# Patient Record
Sex: MALE | Race: WHITE | URBAN - METROPOLITAN AREA
[De-identification: names, ages, dates, MRNs, and addresses within clinical notes are randomized per-mention and may not be internally consistent; named-entity substitution may affect disease eponyms.]

---

## 2018-03-01 ENCOUNTER — EMERGENCY (EMERGENCY)
Facility: HOSPITAL | Age: 28
LOS: 0 days | Discharge: HOME | End: 2018-03-01

## 2018-03-01 VITALS
DIASTOLIC BLOOD PRESSURE: 91 MMHG | SYSTOLIC BLOOD PRESSURE: 138 MMHG | TEMPERATURE: 99 F | RESPIRATION RATE: 18 BRPM | OXYGEN SATURATION: 98 % | HEART RATE: 101 BPM

## 2018-03-01 DIAGNOSIS — Z88.8 ALLERGY STATUS TO OTHER DRUGS, MEDICAMENTS AND BIOLOGICAL SUBSTANCES: ICD-10-CM

## 2018-03-01 DIAGNOSIS — R50.9 FEVER, UNSPECIFIED: ICD-10-CM

## 2018-03-01 DIAGNOSIS — B34.9 VIRAL INFECTION, UNSPECIFIED: ICD-10-CM

## 2018-03-01 NOTE — ED ADULT NURSE NOTE - OBJECTIVE STATEMENT
Pt c/o dry cough, chills, fevers, loss of appetite x 1 approximately 1 week. Pt states he has been controlling fevers at home with Motrin. Last dose was today at about 7am. Pt denies n/v, CP, SOB, dizziness. AAO x 4. Denies allergies, med hx.

## 2018-03-01 NOTE — ED PROVIDER NOTE - OBJECTIVE STATEMENT
this is a 28 year old male presenting to the ed for evalution of cough , fever, chills, - lost voice for 4 days- no other ssymptoms - no abdomional pain , no back pain , no n/v

## 2019-04-13 ENCOUNTER — EMERGENCY (EMERGENCY)
Facility: HOSPITAL | Age: 29
LOS: 0 days | Discharge: HOME | End: 2019-04-13
Attending: EMERGENCY MEDICINE | Admitting: EMERGENCY MEDICINE
Payer: COMMERCIAL

## 2019-04-13 VITALS
SYSTOLIC BLOOD PRESSURE: 122 MMHG | HEART RATE: 62 BPM | DIASTOLIC BLOOD PRESSURE: 56 MMHG | TEMPERATURE: 97 F | RESPIRATION RATE: 18 BRPM | OXYGEN SATURATION: 97 %
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
## 2019-04-13 DIAGNOSIS — R51 HEADACHE: ICD-10-CM

## 2019-04-13 DIAGNOSIS — R11.0 NAUSEA: ICD-10-CM

## 2019-04-13 DIAGNOSIS — R07.81 PLEURODYNIA: ICD-10-CM
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
## 2019-04-13 DIAGNOSIS — Z79.899 OTHER LONG TERM (CURRENT) DRUG THERAPY: ICD-10-CM

## 2019-04-13 DIAGNOSIS — Z88.8 ALLERGY STATUS TO OTHER DRUGS, MEDICAMENTS AND BIOLOGICAL SUBSTANCES STATUS: ICD-10-CM

## 2019-04-13 PROCEDURE — 70450 CT HEAD/BRAIN W/O DYE: CPT | Mod: 26

## 2019-04-13 PROCEDURE — 71046 X-RAY EXAM CHEST 2 VIEWS: CPT | Mod: 26

## 2019-04-13 PROCEDURE — 99284 EMERGENCY DEPT VISIT MOD MDM: CPT

## 2019-04-13 RX ORDER — CETIRIZINE HYDROCHLORIDE 10 MG/1
0 TABLET ORAL
Qty: 0 | Refills: 0 | COMMUNITY

## 2019-04-13 RX ORDER — ACETAMINOPHEN 500 MG
975 TABLET ORAL ONCE
Qty: 0 | Refills: 0 | Status: COMPLETED | OUTPATIENT
Start: 2019-04-13 | End: 2019-04-13

## 2019-04-13 RX ORDER — LIDOCAINE 4 G/100G
15 CREAM TOPICAL
Qty: 150 | Refills: 0 | OUTPATIENT
Start: 2019-04-13 | End: 2019-04-15

## 2019-04-13 RX ORDER — METHOCARBAMOL 500 MG/1
2 TABLET, FILM COATED ORAL
Qty: 24 | Refills: 0 | OUTPATIENT
Start: 2019-04-13 | End: 2019-04-15

## 2019-04-13 RX ORDER — METHOCARBAMOL 500 MG/1
1000 TABLET, FILM COATED ORAL ONCE
Qty: 0 | Refills: 0 | Status: COMPLETED | OUTPATIENT
Start: 2019-04-13 | End: 2019-04-13

## 2019-04-13 RX ORDER — IBUPROFEN 200 MG
1 TABLET ORAL
Qty: 12 | Refills: 0 | OUTPATIENT
Start: 2019-04-13 | End: 2019-04-15

## 2019-04-13 RX ADMIN — METHOCARBAMOL 1000 MILLIGRAM(S): 500 TABLET, FILM COATED ORAL at 17:52

## 2019-04-13 RX ADMIN — Medication 975 MILLIGRAM(S): at 17:51

## 2019-04-13 NOTE — ED PROVIDER NOTE - NSFOLLOWUPCLINICS_GEN_ALL_ED_FT
[facility]  Rehabilitation  [address] [place] [address]  Phone: [phone]  Fax:   Follow Up Time:

## 2019-04-13 NOTE — ED PROVIDER NOTE - NS ED ROS FT
Constitutional: No fever, chills, unintended weight loss.  Eyes:  No visual changes, eye pain or discharge.  ENMT:  No hearing changes, pain, no sore throat or runny nose, no difficulty swallowing  Cardiac:  No chest pain, SOB or edema. No chest pain with exertion.  Respiratory:  No cough or respiratory distress. No hemoptysis. No history of asthma or RAD.  GI:  +N, vomiting, diarrhea or abdominal pain.  :  No dysuria, frequency or burning.  MS:  No myalgia, muscle weakness, joint pain or back pain.  Neuro:  +headache, no focal numbness or weakness.  Skin:  No skin rash.   Endocrine: No history of thyroid disease or diabetes.
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
## 2019-04-13 NOTE — ED ADULT NURSE NOTE - NSIMPLEMENTINTERV_GEN_ALL_ED
Implemented All Universal Safety Interventions:  [place] to call system. Call bell, personal items and telephone within reach. Instruct patient to call for assistance. Room bathroom lighting operational. Non-slip footwear when patient is off stretcher. Physically safe environment: no spills, clutter or unnecessary equipment. Stretcher in lowest position, wheels locked, appropriate side rails in place.

## 2019-04-13 NOTE — ED PROVIDER NOTE - CARE PROVIDER_API CALL
Rashida Santoyo (PhD)  [facility]  [address] [place] [address]  Phone: [phone]  Fax: [phone]  Follow Up Time:

## 2019-04-13 NOTE — ED PROVIDER NOTE - PHYSICAL EXAMINATION
VITAL SIGNS: I have reviewed nursing notes and confirm.  CONSTITUTIONAL: Well-developed; well-nourished; in no acute distress.  SKIN: Skin exam is warm and dry, no acute rash.  HEAD: Normocephalic; atraumatic.  EYES: PERRL, EOM intact; conjunctiva and sclera clear.  ENT: No nasal discharge; airway clear.  NECK: Supple; no midline or paraspinal ttp  CHEST: atraumatic, mild ttp over L ant ribs, no ecchymosis or crepitus  CARD: S1, S2 normal; no murmurs, gallops, or rubs. Regular rate and rhythm.  RESP: No wheezes, rales or rhonchi.  ABD: Normal bowel sounds; soft; non-distended; non-tender; no hepatosplenomegaly.  BACK: non-tender, no CVAT  EXT: Normal ROM. No clubbing, cyanosis or edema. DPI.  NEURO: aaox3, cn 2-12 intact bl no nystagmus or facial droop, 5/5 strength x 4 no drift, gross sensation intact, finger-nose nl, gait nl, romberg neg  PSYCH: Cooperative, appropriate.

## 2019-04-13 NOTE — ED ADULT NURSE NOTE - CHPI ED NUR SYMPTOMS NEG
no acting out behaviors/no decreased eating/drinking/no laceration/no bruising/no back pain/no fussiness/no loss of consciousness/no difficulty bearing weight/no neck tenderness/no crying/no headache/no sleeping issues/no disorientation/no dizziness
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
## 2021-06-06 ENCOUNTER — EMERGENCY (EMERGENCY)
Facility: HOSPITAL | Age: 31
LOS: 0 days | Discharge: HOME | End: 2021-06-06
Attending: EMERGENCY MEDICINE | Admitting: EMERGENCY MEDICINE
Payer: MEDICAID

## 2021-06-06 VITALS
RESPIRATION RATE: 18 BRPM | DIASTOLIC BLOOD PRESSURE: 80 MMHG | OXYGEN SATURATION: 99 % | TEMPERATURE: 99 F | HEART RATE: 93 BPM | SYSTOLIC BLOOD PRESSURE: 132 MMHG

## 2021-06-06 VITALS
TEMPERATURE: 100 F | RESPIRATION RATE: 17 BRPM | HEIGHT: 65 IN | HEART RATE: 103 BPM | OXYGEN SATURATION: 98 % | DIASTOLIC BLOOD PRESSURE: 92 MMHG | WEIGHT: 210.98 LBS | SYSTOLIC BLOOD PRESSURE: 131 MMHG

## 2021-06-06 DIAGNOSIS — R07.89 OTHER CHEST PAIN: ICD-10-CM

## 2021-06-06 DIAGNOSIS — R50.9 FEVER, UNSPECIFIED: ICD-10-CM

## 2021-06-06 DIAGNOSIS — R06.00 DYSPNEA, UNSPECIFIED: ICD-10-CM

## 2021-06-06 DIAGNOSIS — R06.02 SHORTNESS OF BREATH: ICD-10-CM

## 2021-06-06 DIAGNOSIS — R50.83 POSTVACCINATION FEVER: ICD-10-CM

## 2021-06-06 DIAGNOSIS — Z88.8 ALLERGY STATUS TO OTHER DRUGS, MEDICAMENTS AND BIOLOGICAL SUBSTANCES STATUS: ICD-10-CM

## 2021-06-06 LAB
ALBUMIN SERPL ELPH-MCNC: 5.1 G/DL — SIGNIFICANT CHANGE UP (ref 3.5–5.2)
ALP SERPL-CCNC: 54 U/L — SIGNIFICANT CHANGE UP (ref 30–115)
ALT FLD-CCNC: 28 U/L — SIGNIFICANT CHANGE UP (ref 0–41)
ANION GAP SERPL CALC-SCNC: 11 MMOL/L — SIGNIFICANT CHANGE UP (ref 7–14)
AST SERPL-CCNC: 20 U/L — SIGNIFICANT CHANGE UP (ref 0–41)
BASOPHILS # BLD AUTO: 0.03 K/UL — SIGNIFICANT CHANGE UP (ref 0–0.2)
BASOPHILS NFR BLD AUTO: 0.5 % — SIGNIFICANT CHANGE UP (ref 0–1)
BILIRUB SERPL-MCNC: 0.3 MG/DL — SIGNIFICANT CHANGE UP (ref 0.2–1.2)
BUN SERPL-MCNC: 15 MG/DL — SIGNIFICANT CHANGE UP (ref 10–20)
CALCIUM SERPL-MCNC: 10.2 MG/DL — HIGH (ref 8.5–10.1)
CHLORIDE SERPL-SCNC: 101 MMOL/L — SIGNIFICANT CHANGE UP (ref 98–110)
CO2 SERPL-SCNC: 24 MMOL/L — SIGNIFICANT CHANGE UP (ref 17–32)
CREAT SERPL-MCNC: 1 MG/DL — SIGNIFICANT CHANGE UP (ref 0.7–1.5)
EOSINOPHIL # BLD AUTO: 0.04 K/UL — SIGNIFICANT CHANGE UP (ref 0–0.7)
EOSINOPHIL NFR BLD AUTO: 0.7 % — SIGNIFICANT CHANGE UP (ref 0–8)
GLUCOSE SERPL-MCNC: 97 MG/DL — SIGNIFICANT CHANGE UP (ref 70–99)
HCT VFR BLD CALC: 46.4 % — SIGNIFICANT CHANGE UP (ref 42–52)
HGB BLD-MCNC: 15.5 G/DL — SIGNIFICANT CHANGE UP (ref 14–18)
IMM GRANULOCYTES NFR BLD AUTO: 0.5 % — HIGH (ref 0.1–0.3)
LYMPHOCYTES # BLD AUTO: 1.06 K/UL — LOW (ref 1.2–3.4)
LYMPHOCYTES # BLD AUTO: 17.8 % — LOW (ref 20.5–51.1)
MCHC RBC-ENTMCNC: 28.7 PG — SIGNIFICANT CHANGE UP (ref 27–31)
MCHC RBC-ENTMCNC: 33.4 G/DL — SIGNIFICANT CHANGE UP (ref 32–37)
MCV RBC AUTO: 85.9 FL — SIGNIFICANT CHANGE UP (ref 80–94)
MONOCYTES # BLD AUTO: 0.77 K/UL — HIGH (ref 0.1–0.6)
MONOCYTES NFR BLD AUTO: 12.9 % — HIGH (ref 1.7–9.3)
NEUTROPHILS # BLD AUTO: 4.03 K/UL — SIGNIFICANT CHANGE UP (ref 1.4–6.5)
NEUTROPHILS NFR BLD AUTO: 67.6 % — SIGNIFICANT CHANGE UP (ref 42.2–75.2)
NRBC # BLD: 0 /100 WBCS — SIGNIFICANT CHANGE UP (ref 0–0)
PLATELET # BLD AUTO: 234 K/UL — SIGNIFICANT CHANGE UP (ref 130–400)
POTASSIUM SERPL-MCNC: 4.3 MMOL/L — SIGNIFICANT CHANGE UP (ref 3.5–5)
POTASSIUM SERPL-SCNC: 4.3 MMOL/L — SIGNIFICANT CHANGE UP (ref 3.5–5)
PROT SERPL-MCNC: 7.8 G/DL — SIGNIFICANT CHANGE UP (ref 6–8)
RBC # BLD: 5.4 M/UL — SIGNIFICANT CHANGE UP (ref 4.7–6.1)
RBC # FLD: 12.9 % — SIGNIFICANT CHANGE UP (ref 11.5–14.5)
SODIUM SERPL-SCNC: 136 MMOL/L — SIGNIFICANT CHANGE UP (ref 135–146)
TROPONIN T SERPL-MCNC: <0.01 NG/ML — SIGNIFICANT CHANGE UP
WBC # BLD: 5.96 K/UL — SIGNIFICANT CHANGE UP (ref 4.8–10.8)
WBC # FLD AUTO: 5.96 K/UL — SIGNIFICANT CHANGE UP (ref 4.8–10.8)

## 2021-06-06 PROCEDURE — 71045 X-RAY EXAM CHEST 1 VIEW: CPT | Mod: 26

## 2021-06-06 PROCEDURE — 99285 EMERGENCY DEPT VISIT HI MDM: CPT

## 2021-06-06 PROCEDURE — 93010 ELECTROCARDIOGRAM REPORT: CPT

## 2021-06-06 RX ORDER — ACETAMINOPHEN 500 MG
650 TABLET ORAL ONCE
Refills: 0 | Status: COMPLETED | OUTPATIENT
Start: 2021-06-06 | End: 2021-06-06

## 2021-06-06 RX ORDER — SODIUM CHLORIDE 9 MG/ML
1000 INJECTION, SOLUTION INTRAVENOUS ONCE
Refills: 0 | Status: COMPLETED | OUTPATIENT
Start: 2021-06-06 | End: 2021-06-06

## 2021-06-06 RX ADMIN — Medication 650 MILLIGRAM(S): at 11:46

## 2021-06-06 RX ADMIN — SODIUM CHLORIDE 1000 MILLILITER(S): 9 INJECTION, SOLUTION INTRAVENOUS at 11:46

## 2021-06-06 NOTE — ED PROVIDER NOTE - NS ED ROS FT
Eyes:  No visual changes, eye pain or discharge.  ENMT:  No hearing changes, pain, no sore throat or runny nose, no difficulty swallowing  Cardiac:  + chest pain + SOB  Respiratory:  No cough or respiratory distress. No hemoptysis. No history of asthma or RAD.  GI:  No nausea, vomiting, diarrhea or abdominal pain.  :  No dysuria, frequency or burning.  MS:  No myalgia, muscle weakness, joint pain or back pain.  Neuro:  No headache or weakness.  No LOC.  Skin:  No skin rash.   Endocrine: No history of thyroid disease or diabetes.

## 2021-06-06 NOTE — ED PROVIDER NOTE - ATTENDING CONTRIBUTION TO CARE
31 year old male, no pmhx presenting s/p COVID vaccine yesterday with chest pain and dyspnea since this AM. Pain is described as sharp, non-radiating, non-pleuritic, no palliative or provocative factors, mild severity, associated with dyspnea and fever, tmax 100F. Patient has been able to ambulate despite symptoms. Denies cough, N/V/D, blood in stool or any other complaints.    Vital Signs: I have reviewed the initial vital signs.  Constitutional: NAD, well-nourished, appears stated age, no acute distress.  HEENT: Airway patent, moist MM, no erythema/swelling/deformity of oral structures. EOMI, PERRLA.  CV: regular rate, regular rhythm, well-perfused extremities, 2+ b/l DP and radial pulses equal.  Lungs: BCTA, no increased WOB.  ABD: NTND, no guarding or rebound, no pulsatile mass, no hernias.   MSK: Neck supple, nontender, nl ROM, no stepoff. Chest nontender. Back nontender in TLS spine or to b/l bony structures or flanks. Ext nontender, nl rom, no deformity.   INTEG: Skin warm, dry, no rash.  NEURO: A&Ox3, normal strength, nl sensation throughout, normal speech.   PSYCH: Calm, cooperative, normal affect and interaction.    Very well appearing, ambulatory in ED without difficulty or desaturation. Will obtain EKG, CXR, labs, re-eval.

## 2021-06-06 NOTE — ED PROVIDER NOTE - CLINICAL SUMMARY MEDICAL DECISION MAKING FREE TEXT BOX
Patient presented with chest pain since yesterday s/p COVID vaccine. Otherwise afebrile, Hd stable, well appearing. EKG obtained and non-ischemic without evidence of STEMI or pericarditis. Obtained labs which were grossly unremarkable including no significant leukocytosis, anemia, signs of dehydration/ANG, transaminitis or significant electrolyte abnormalities. Trop negative. Chest xray negative for pneumothorax, pneumonia, widened mediastinum, evidence of rib fractures, enlarged cardiac silhouette or any other emergent pathologies. Patient remained without recurrence of chest pain or abnormalities during monitoring in ED. Ambulatory without difficulty, tolerates PO. HEART Score 0, PERC 0/no PE risk factors, no concern clinically for dissection. Given the above, will discharge home with outpatient follow up. Patient agreeable with plan. Agrees to return to ED for any new or worsening symptoms.

## 2021-06-06 NOTE — ED PROVIDER NOTE - PHYSICAL EXAMINATION
CONSTITUTIONAL: Well-developed; well-nourished; in no acute distress.   SKIN: warm, dry  HEAD: Normocephalic; atraumatic.  EYES: PERRL, EOMI, no conjunctival erythema  ENT: No nasal discharge; airway clear.  NECK: Supple; non tender.  CARD: S1, S2 normal; no murmurs, gallops, or rubs. tachy.   RESP: No wheezes, rales or rhonchi.  ABD: soft ntnd  EXT: Normal ROM.  No clubbing, cyanosis or edema.   LYMPH: No acute cervical adenopathy.  NEURO: Alert, oriented, grossly unremarkable  PSYCH: Cooperative, appropriate.

## 2021-06-06 NOTE — ED PROVIDER NOTE - PROGRESS NOTE DETAILS
Duy: Pt reassed. Endorses feeling much better. CP has resolved. No more dizziness. Pt. revitaled. Afebrile 98.9, nontachycardic 89, sating at 99% on room air.

## 2021-06-06 NOTE — ED PROVIDER NOTE - OBJECTIVE STATEMENT
31 y male, no pmh, pw cp. Endorses getting his second dose of covid vaccine yesterday. Then last night, pt started having chest pain, stabbing sensation, lower left chest, 6/10, on and off, no all/agg factors, associated with 3-4 episodes of sob that resolved and low grade fever around 100. Rt. now pt does not have the pain and is only tired from not sleeping. Denies cough n/v/d, abd pain, dysuria, syncopal episode

## 2021-06-06 NOTE — ED PROVIDER NOTE - PATIENT PORTAL LINK FT
You can access the FollowMyHealth Patient Portal offered by [facility] by registering at the following website: [email or web address]. By joining [facility]’s FollowMyHealth portal, you will also be able to view your health information using other applications (apps) compatible with our system.

## 2021-10-08 ENCOUNTER — OUTPATIENT (OUTPATIENT)
Dept: OUTPATIENT SERVICES | Facility: HOSPITAL | Age: 31
LOS: 1 days | Discharge: HOME | End: 2021-10-08
Payer: COMMERCIAL

## 2021-10-08 DIAGNOSIS — R22.2 LOCALIZED SWELLING, MASS AND LUMP, TRUNK: ICD-10-CM

## 2021-10-08 PROCEDURE — 71120 X-RAY EXAM BREASTBONE 2/>VWS: CPT | Mod: 26

## 2021-10-08 PROCEDURE — 76604 US EXAM CHEST: CPT | Mod: 26

## 2021-11-16 ENCOUNTER — OUTPATIENT (OUTPATIENT)
Dept: OUTPATIENT SERVICES | Facility: HOSPITAL | Age: 31
LOS: 1 days | Discharge: HOME | End: 2021-11-16
Payer: COMMERCIAL

## 2021-11-16 DIAGNOSIS — R22.2 LOCALIZED SWELLING, MASS AND LUMP, TRUNK: ICD-10-CM

## 2021-11-16 PROBLEM — Z00.00 ENCOUNTER FOR PREVENTIVE HEALTH EXAMINATION: Status: ACTIVE | Noted: 2021-11-16

## 2021-11-16 PROCEDURE — 71552 MRI CHEST W/O & W/DYE: CPT | Mod: 26

## 2023-05-11 ENCOUNTER — EMERGENCY (EMERGENCY)
Facility: HOSPITAL | Age: 33
LOS: 0 days | Discharge: HOME | End: 2023-05-12
Attending: EMERGENCY MEDICINE
Payer: COMMERCIAL

## 2023-05-11 VITALS
TEMPERATURE: 99 F | SYSTOLIC BLOOD PRESSURE: 151 MMHG | OXYGEN SATURATION: 97 % | HEART RATE: 111 BPM | DIASTOLIC BLOOD PRESSURE: 101 MMHG | WEIGHT: 164.91 LBS | RESPIRATION RATE: 19 BRPM

## 2023-05-11 DIAGNOSIS — R09.81 NASAL CONGESTION: ICD-10-CM

## 2023-05-11 DIAGNOSIS — Z88.8 ALLERGY STATUS TO OTHER DRUGS, MEDICAMENTS AND BIOLOGICAL SUBSTANCES: ICD-10-CM

## 2023-05-11 DIAGNOSIS — R10.11 RIGHT UPPER QUADRANT PAIN: ICD-10-CM

## 2023-05-11 DIAGNOSIS — R05.1 ACUTE COUGH: ICD-10-CM

## 2023-05-11 DIAGNOSIS — K52.9 NONINFECTIVE GASTROENTERITIS AND COLITIS, UNSPECIFIED: ICD-10-CM

## 2023-05-11 DIAGNOSIS — R10.31 RIGHT LOWER QUADRANT PAIN: ICD-10-CM

## 2023-05-11 DIAGNOSIS — K76.0 FATTY (CHANGE OF) LIVER, NOT ELSEWHERE CLASSIFIED: ICD-10-CM

## 2023-05-11 PROCEDURE — 99285 EMERGENCY DEPT VISIT HI MDM: CPT

## 2023-05-11 PROCEDURE — 85025 COMPLETE CBC W/AUTO DIFF WBC: CPT

## 2023-05-11 PROCEDURE — 99284 EMERGENCY DEPT VISIT MOD MDM: CPT | Mod: 25

## 2023-05-11 PROCEDURE — 80053 COMPREHEN METABOLIC PANEL: CPT

## 2023-05-11 PROCEDURE — 36415 COLL VENOUS BLD VENIPUNCTURE: CPT

## 2023-05-11 PROCEDURE — 74177 CT ABD & PELVIS W/CONTRAST: CPT | Mod: MA

## 2023-05-11 PROCEDURE — 83690 ASSAY OF LIPASE: CPT

## 2023-05-11 PROCEDURE — 96374 THER/PROPH/DIAG INJ IV PUSH: CPT

## 2023-05-11 PROCEDURE — 83605 ASSAY OF LACTIC ACID: CPT

## 2023-05-11 PROCEDURE — 81001 URINALYSIS AUTO W/SCOPE: CPT

## 2023-05-11 PROCEDURE — 87086 URINE CULTURE/COLONY COUNT: CPT

## 2023-05-11 PROCEDURE — 71046 X-RAY EXAM CHEST 2 VIEWS: CPT

## 2023-05-11 PROCEDURE — 96375 TX/PRO/DX INJ NEW DRUG ADDON: CPT

## 2023-05-11 NOTE — ED ADULT TRIAGE NOTE - CHIEF COMPLAINT QUOTE
Pt came c/o RLQ pain x 2 days, cough and congestion x 1 week, pt stated he was exposed to mold for unknown amount of time.

## 2023-05-12 VITALS
OXYGEN SATURATION: 100 % | HEART RATE: 77 BPM | DIASTOLIC BLOOD PRESSURE: 88 MMHG | TEMPERATURE: 99 F | RESPIRATION RATE: 18 BRPM | SYSTOLIC BLOOD PRESSURE: 134 MMHG

## 2023-05-12 LAB
ALBUMIN SERPL ELPH-MCNC: 4.1 G/DL — SIGNIFICANT CHANGE UP (ref 3.5–5.2)
ALP SERPL-CCNC: 51 U/L — SIGNIFICANT CHANGE UP (ref 30–115)
ALT FLD-CCNC: 24 U/L — SIGNIFICANT CHANGE UP (ref 0–41)
ANION GAP SERPL CALC-SCNC: 16 MMOL/L — HIGH (ref 7–14)
APPEARANCE UR: CLEAR — SIGNIFICANT CHANGE UP
AST SERPL-CCNC: 29 U/L — SIGNIFICANT CHANGE UP (ref 0–41)
BACTERIA # UR AUTO: NEGATIVE — SIGNIFICANT CHANGE UP
BASOPHILS # BLD AUTO: 0.06 K/UL — SIGNIFICANT CHANGE UP (ref 0–0.2)
BASOPHILS NFR BLD AUTO: 0.8 % — SIGNIFICANT CHANGE UP (ref 0–1)
BILIRUB SERPL-MCNC: 0.3 MG/DL — SIGNIFICANT CHANGE UP (ref 0.2–1.2)
BILIRUB UR-MCNC: NEGATIVE — SIGNIFICANT CHANGE UP
BUN SERPL-MCNC: 13 MG/DL — SIGNIFICANT CHANGE UP (ref 10–20)
CALCIUM SERPL-MCNC: 8.7 MG/DL — SIGNIFICANT CHANGE UP (ref 8.4–10.5)
CHLORIDE SERPL-SCNC: 100 MMOL/L — SIGNIFICANT CHANGE UP (ref 98–110)
CO2 SERPL-SCNC: 23 MMOL/L — SIGNIFICANT CHANGE UP (ref 17–32)
COLOR SPEC: YELLOW — SIGNIFICANT CHANGE UP
CREAT SERPL-MCNC: 0.7 MG/DL — SIGNIFICANT CHANGE UP (ref 0.7–1.5)
DIFF PNL FLD: NEGATIVE — SIGNIFICANT CHANGE UP
EGFR: 123 ML/MIN/1.73M2 — SIGNIFICANT CHANGE UP
EOSINOPHIL # BLD AUTO: 0.28 K/UL — SIGNIFICANT CHANGE UP (ref 0–0.7)
EOSINOPHIL NFR BLD AUTO: 3.6 % — SIGNIFICANT CHANGE UP (ref 0–8)
EPI CELLS # UR: 1 /HPF — SIGNIFICANT CHANGE UP (ref 0–5)
GLUCOSE SERPL-MCNC: 122 MG/DL — HIGH (ref 70–99)
GLUCOSE UR QL: NEGATIVE — SIGNIFICANT CHANGE UP
HCT VFR BLD CALC: 40.9 % — LOW (ref 42–52)
HGB BLD-MCNC: 14 G/DL — SIGNIFICANT CHANGE UP (ref 14–18)
HYALINE CASTS # UR AUTO: 2 /LPF — SIGNIFICANT CHANGE UP (ref 0–7)
IMM GRANULOCYTES NFR BLD AUTO: 0.4 % — HIGH (ref 0.1–0.3)
KETONES UR-MCNC: ABNORMAL
LACTATE SERPL-SCNC: 2 MMOL/L — SIGNIFICANT CHANGE UP (ref 0.7–2)
LEUKOCYTE ESTERASE UR-ACNC: NEGATIVE — SIGNIFICANT CHANGE UP
LIDOCAIN IGE QN: 20 U/L — SIGNIFICANT CHANGE UP (ref 7–60)
LYMPHOCYTES # BLD AUTO: 1.25 K/UL — SIGNIFICANT CHANGE UP (ref 1.2–3.4)
LYMPHOCYTES # BLD AUTO: 15.9 % — LOW (ref 20.5–51.1)
MCHC RBC-ENTMCNC: 29.6 PG — SIGNIFICANT CHANGE UP (ref 27–31)
MCHC RBC-ENTMCNC: 34.2 G/DL — SIGNIFICANT CHANGE UP (ref 32–37)
MCV RBC AUTO: 86.5 FL — SIGNIFICANT CHANGE UP (ref 80–94)
MONOCYTES # BLD AUTO: 0.83 K/UL — HIGH (ref 0.1–0.6)
MONOCYTES NFR BLD AUTO: 10.6 % — HIGH (ref 1.7–9.3)
NEUTROPHILS # BLD AUTO: 5.41 K/UL — SIGNIFICANT CHANGE UP (ref 1.4–6.5)
NEUTROPHILS NFR BLD AUTO: 68.7 % — SIGNIFICANT CHANGE UP (ref 42.2–75.2)
NITRITE UR-MCNC: NEGATIVE — SIGNIFICANT CHANGE UP
NRBC # BLD: 0 /100 WBCS — SIGNIFICANT CHANGE UP (ref 0–0)
PH UR: 6 — SIGNIFICANT CHANGE UP (ref 5–8)
PLATELET # BLD AUTO: 333 K/UL — SIGNIFICANT CHANGE UP (ref 130–400)
PMV BLD: 9.6 FL — SIGNIFICANT CHANGE UP (ref 7.4–10.4)
POTASSIUM SERPL-MCNC: 3.9 MMOL/L — SIGNIFICANT CHANGE UP (ref 3.5–5)
POTASSIUM SERPL-SCNC: 3.9 MMOL/L — SIGNIFICANT CHANGE UP (ref 3.5–5)
PROT SERPL-MCNC: 6.5 G/DL — SIGNIFICANT CHANGE UP (ref 6–8)
PROT UR-MCNC: ABNORMAL
RBC # BLD: 4.73 M/UL — SIGNIFICANT CHANGE UP (ref 4.7–6.1)
RBC # FLD: 12.4 % — SIGNIFICANT CHANGE UP (ref 11.5–14.5)
RBC CASTS # UR COMP ASSIST: 1 /HPF — SIGNIFICANT CHANGE UP (ref 0–4)
SODIUM SERPL-SCNC: 139 MMOL/L — SIGNIFICANT CHANGE UP (ref 135–146)
SP GR SPEC: >1.05 (ref 1.01–1.03)
UROBILINOGEN FLD QL: SIGNIFICANT CHANGE UP
WBC # BLD: 7.86 K/UL — SIGNIFICANT CHANGE UP (ref 4.8–10.8)
WBC # FLD AUTO: 7.86 K/UL — SIGNIFICANT CHANGE UP (ref 4.8–10.8)
WBC UR QL: 1 /HPF — SIGNIFICANT CHANGE UP (ref 0–5)

## 2023-05-12 PROCEDURE — 74177 CT ABD & PELVIS W/CONTRAST: CPT | Mod: 26,MA

## 2023-05-12 PROCEDURE — 71046 X-RAY EXAM CHEST 2 VIEWS: CPT | Mod: 26

## 2023-05-12 RX ORDER — SACCHAROMYCES BOULARDII 250 MG
2 POWDER IN PACKET (EA) ORAL
Qty: 28 | Refills: 0
Start: 2023-05-12 | End: 2023-05-18

## 2023-05-12 RX ORDER — SODIUM CHLORIDE 9 MG/ML
1000 INJECTION, SOLUTION INTRAVENOUS ONCE
Refills: 0 | Status: COMPLETED | OUTPATIENT
Start: 2023-05-12 | End: 2023-05-12

## 2023-05-12 RX ORDER — CIPROFLOXACIN LACTATE 400MG/40ML
1 VIAL (ML) INTRAVENOUS
Qty: 14 | Refills: 0
Start: 2023-05-12 | End: 2023-05-18

## 2023-05-12 RX ORDER — SODIUM CHLORIDE 9 MG/ML
1000 INJECTION INTRAMUSCULAR; INTRAVENOUS; SUBCUTANEOUS ONCE
Refills: 0 | Status: COMPLETED | OUTPATIENT
Start: 2023-05-12 | End: 2023-05-12

## 2023-05-12 RX ORDER — CEFTRIAXONE 500 MG/1
1000 INJECTION, POWDER, FOR SOLUTION INTRAMUSCULAR; INTRAVENOUS ONCE
Refills: 0 | Status: COMPLETED | OUTPATIENT
Start: 2023-05-12 | End: 2023-05-12

## 2023-05-12 RX ORDER — MORPHINE SULFATE 50 MG/1
4 CAPSULE, EXTENDED RELEASE ORAL ONCE
Refills: 0 | Status: DISCONTINUED | OUTPATIENT
Start: 2023-05-12 | End: 2023-05-12

## 2023-05-12 RX ORDER — METRONIDAZOLE 500 MG
500 TABLET ORAL ONCE
Refills: 0 | Status: COMPLETED | OUTPATIENT
Start: 2023-05-12 | End: 2023-05-12

## 2023-05-12 RX ORDER — METRONIDAZOLE 500 MG
1 TABLET ORAL
Qty: 21 | Refills: 0
Start: 2023-05-12 | End: 2023-05-18

## 2023-05-12 RX ADMIN — CEFTRIAXONE 100 MILLIGRAM(S): 500 INJECTION, POWDER, FOR SOLUTION INTRAMUSCULAR; INTRAVENOUS at 08:09

## 2023-05-12 RX ADMIN — SODIUM CHLORIDE 1000 MILLILITER(S): 9 INJECTION, SOLUTION INTRAVENOUS at 00:00

## 2023-05-12 RX ADMIN — SODIUM CHLORIDE 1000 MILLILITER(S): 9 INJECTION INTRAMUSCULAR; INTRAVENOUS; SUBCUTANEOUS at 05:00

## 2023-05-12 RX ADMIN — Medication 100 MILLIGRAM(S): at 08:09

## 2023-05-12 NOTE — ED PROVIDER NOTE - ATTENDING CONTRIBUTION TO CARE
I personally evaluated the patient. I reviewed the Resident’s or Physician Assistant’s note (as assigned above), and agree with the findings and plan except as documented in my note.  33-year-old male, otherwise healthy, presents with complaints of right flank pain for the past several hours.  Denies associated nausea or vomiting.  Denies fever, no diarrhea, no urinary symptoms.  Patient also complains of 1 week of cough. VSS, non toxic appearing, NAD, Head NCAT, MMM, neck supple, normal ROM, normal s1s2, lungs ctab, abd s/nondistended, tenderness to palpation of the right lower quadrant, no guarding or rebound, extremities wnl, AAO x 3, GCS 15, neuro grossly normal. No acute skin lesions. Plan is labs, imaging, meds and reassess.

## 2023-05-12 NOTE — ED PROVIDER NOTE - OBJECTIVE STATEMENT
33 year old male with no pmh presenting to the ED for R flank/R sided abdominal pain. States that it started this evening while at rest, pain has been constant. Also with 2 weeks of cough- thinks he may have had exposure to mold. Associated w/ congestion but states that the cough has been improving. No fevers/chills or n/v.

## 2023-05-12 NOTE — ED PROVIDER NOTE - CLINICAL SUMMARY MEDICAL DECISION MAKING FREE TEXT BOX
ccruz - pt signed out to me by Dr Elliott, pt found to have colitis, dc home w po abx, f/u pmd/gi 1-2 weeks, strict return precautions provided. pt also informed of fatty liver.

## 2023-05-12 NOTE — ED ADULT NURSE NOTE - NSFALLUNIVINTERV_ED_ALL_ED
Bed/Stretcher in lowest position, wheels locked, appropriate side rails in place/Call bell, personal items and telephone in reach/Instruct patient to call for assistance before getting out of bed/chair/stretcher/Non-slip footwear applied when patient is off stretcher/[place] to call system/Physically safe environment - no spills, clutter or unnecessary equipment/Purposeful proactive rounding/Room/bathroom lighting operational, light cord in reach

## 2023-05-12 NOTE — ED PROVIDER NOTE - PHYSICAL EXAMINATION
CONSTITUTIONAL: Well-developed; well-nourished; in no acute distress.   SKIN: warm, dry; no rashes.  HEAD: Normocephalic; atraumatic.  EYES: PERRLA, EOMI, no conjunctival erythema.  ENT: No nasal discharge; airway clear.  NECK: Supple; non tender.  CARD: S1, S2 normal; no murmurs, gallops, or rubs. Regular rate and rhythm.   RESP: No wheezes, rales or rhonchi. lungs ctab.  ABD: soft, nd, (+) R flank/RLQ abdominal ttp.  EXT: Normal ROM.  No clubbing, cyanosis or edema.  NEURO: Alert, oriented, grossly unremarkable. No FND.  PSYCH: Cooperative, appropriate.

## 2023-05-12 NOTE — ED ADULT NURSE REASSESSMENT NOTE - NS ED NURSE REASSESS COMMENT FT1
Pt assessed, A&Ox4, VSS. Pt denies pain/discomfort at this time, awaiting urine sample. IV intact. No acute distress noted. Safety and comfort measures maintained. Will continue to monitor

## 2023-05-12 NOTE — ED PROVIDER NOTE - PROGRESS NOTE DETAILS
Resident AO: Patient received as signout from Dr. Mcfarland, pending reassessment, results, and disposition.  CT with wall thickening of transverse colon versus underdistention, possibly representative of colitis; fatty liver noted.  On exam, patient comfortable without any acute distress.  Abdomen soft and nontender.  Pending urinalysis.  Patient taken to the restroom to urinate, however states he does not have to at this time.  Currently receiving second IV liter bolus.  Overnight team ordered ceftriaxone and Flagyl for colitis and UTI coverage, however low suspicion for UTI. Resident AO: Results reviewed and discussed with patient. All questions answered, patient expressed understanding, and agreed with outpatient follow up. Return precautions given.

## 2023-05-12 NOTE — ED ADULT NURSE NOTE - NS ED NOTE ABUSE SUSPICION NEGLECT YN
Prescription approved per [facility] Refill Protocol.  Beatriz Raza, BSN, RN, PHN  [facility] RN for [place]  June 2, 2022    
No

## 2023-05-12 NOTE — ED PROVIDER NOTE - NSFOLLOWUPINSTRUCTIONS_ED_ALL_ED_FT
You were evaluated in the Emergency Department today, and your visit did not reveal anything immediately life-threatening.    However, it is important that you follow-up with your PRIMARY CARE PHYSICIAN within 3-5 days.  If you do not have a primary care physician, please call your health insurance to select one.  If you do not have health insurance, please schedule an appointment with the [facility]: [phone], located at [address] [place] [address].    Additionally, it is important that you follow-up with GASTROENTEROLOGY. Our Emergency Department Referral Coordinators will be reaching out to you in the next 24-48 hours from 9:00am to 5:00pm with a follow up appointment(s). Please expect a phone call from the hospital in that time frame. If you do not receive a call or if you have any questions or concerns, you can reach them at [phone].    ---------------------------------------------------------------------------------------------------    For pain, you may take the following over-the-counter medication(s):  - Acetaminophen (Tylenol, Midol) 650-1000 mg up to every 4-6 hours, as needed (max 4000mg/24hrs), AND/OR  - Ibuprofen (Motrin, Advil) 400 mg up to every 6-8 hours, as needed (max 3200mg/24hrs)    Antibiotics were sent to your pharmacy.    ---------------------------------------------------------------------------------------------------    Colitis    Colitis is a condition in which the colon is inflamed. It can cause diarrhea, blood in the stool, and abdominal pain. Colitis can last a short time (be acute), or it may last a long time (become chronic).    What are the causes?  This condition may be caused by:  Infections from viruses or bacteria.  A reaction to medicine.  Certain autoimmune diseases, such as Crohn's disease or ulcerative colitis.  Radiation treatment.  Decreased blood flow to the bowel (ischemia).    What are the signs or symptoms?  Symptoms of this condition include:  Diarrhea, blood in the stool, or black, tarry stool.  Pain in the joints or abdominal pain.  Fever or fatigue.  Vomiting.  Weight loss.  Bloating.  Having fewer bowel movements than usual.  A strong and sudden urge to have a bowel movement.  Feeling like the bowel is not empty after a bowel movement.    How is this treated?  Treatment for this condition depends on the cause. This condition may be treated with:  Steps to rest the bowel, such as not eating or drinking for a period of time.  Fluids that are given through an IV.  Medicine for pain and diarrhea.  Antibiotic medicines.  Cortisone medicines.  Surgery.    Follow these instructions at home:  Eating and drinking  Follow instructions from your health care provider about eating or drinking restrictions.  Drink enough fluid to keep your urine pale yellow.  Work with a dietitian to determine whether certain foods cause your condition to flare up.  Avoid foods or drinks that cause flare-ups.  Eat a well-balanced diet.    General instructions  If you were prescribed an antibiotic medicine, take it as told by your health care provider. Do not stop taking the antibiotic even if you start to feel better.  Take over-the-counter and prescription medicines only as told by your health care provider.  Keep all follow-up visits. This is important.    Contact a health care provider if:  Your symptoms do not go away.  You develop new symptoms.    Get help right away if:  You have a fever that does not go away with treatment.  You develop chills.  You have extreme weakness, fainting, or dehydration.  You vomit repeatedly.  You develop severe pain in your abdomen.  You pass bloody or tarry stool.    Summary  Colitis is a condition in which the colon is inflamed. Colitis can last a short time (be acute), or it may last a long time (become chronic).  Treatment for this condition depends on the cause and may include resting the bowel, taking medicines, or having surgery.  If you were prescribed an antibiotic medicine, take it as told by your health care provider. Do not stop taking the antibiotic even if you start to feel better.  Get help right away if you develop severe pain in your abdomen.  Keep all follow-up visits. This is important.    ---------------------------------------------------------------------------------------------------    Cough    Coughing is a reflex that clears your throat and your airways. Coughing helps to heal and protect your lungs. It is normal to cough occasionally, but a cough that happens with other symptoms or lasts a long time may be a sign of a condition that needs treatment. Coughing may be caused by infections, asthma or COPD, smoking, postnasal drip, gastroesophageal reflux, as well as other medical conditions. Take medicines only as instructed by your health care provider. Avoid environments or triggers that causes you to cough at work or at home.    SEEK IMMEDIATE MEDICAL CARE IF YOU HAVE ANY OF THE FOLLOWING SYMPTOMS: coughing up blood, shortness of breath, rapid heart rate, chest pain, unexplained weight loss or night sweats. You were evaluated in the Emergency Department today, and your visit did not reveal anything immediately life-threatening.    However, it is important that you follow-up with your PRIMARY CARE PHYSICIAN within 3-5 days.  If you do not have a primary care physician, please call your health insurance to select one.  If you do not have health insurance, please schedule an appointment with the [facility]: [phone], located at [address] [place] [address]. Let your primary care doctor know about the following findings:  	- Apparent wall thickening of the transverse colon versus   	underdistention may represent colitis in the appropriate clinical setting   	of infectious or inflammatory etiology.  	- Fatty infiltration of the liver (hepatic steatosis).    Additionally, it is important that you follow-up with GASTROENTEROLOGY. Our Emergency Department Referral Coordinators will be reaching out to you in the next 24-48 hours from 9:00am to 5:00pm with a follow up appointment(s). Please expect a phone call from the hospital in that time frame. If you do not receive a call or if you have any questions or concerns, you can reach them at [phone].    ---------------------------------------------------------------------------------------------------    For pain, you may take the following over-the-counter medication(s):  - Acetaminophen (Tylenol, Midol) 650-1000 mg up to every 4-6 hours, as needed (max 4000mg/24hrs), AND/OR  - Ibuprofen (Motrin, Advil) 400 mg up to every 6-8 hours, as needed (max 3200mg/24hrs)    Antibiotics were sent to your pharmacy.    ---------------------------------------------------------------------------------------------------    Colitis    Colitis is a condition in which the colon is inflamed. It can cause diarrhea, blood in the stool, and abdominal pain. Colitis can last a short time (be acute), or it may last a long time (become chronic).    What are the causes?  This condition may be caused by:  Infections from viruses or bacteria.  A reaction to medicine.  Certain autoimmune diseases, such as Crohn's disease or ulcerative colitis.  Radiation treatment.  Decreased blood flow to the bowel (ischemia).    What are the signs or symptoms?  Symptoms of this condition include:  Diarrhea, blood in the stool, or black, tarry stool.  Pain in the joints or abdominal pain.  Fever or fatigue.  Vomiting.  Weight loss.  Bloating.  Having fewer bowel movements than usual.  A strong and sudden urge to have a bowel movement.  Feeling like the bowel is not empty after a bowel movement.    How is this treated?  Treatment for this condition depends on the cause. This condition may be treated with:  Steps to rest the bowel, such as not eating or drinking for a period of time.  Fluids that are given through an IV.  Medicine for pain and diarrhea.  Antibiotic medicines.  Cortisone medicines.  Surgery.    Follow these instructions at home:  Eating and drinking  Follow instructions from your health care provider about eating or drinking restrictions.  Drink enough fluid to keep your urine pale yellow.  Work with a dietitian to determine whether certain foods cause your condition to flare up.  Avoid foods or drinks that cause flare-ups.  Eat a well-balanced diet.    General instructions  If you were prescribed an antibiotic medicine, take it as told by your health care provider. Do not stop taking the antibiotic even if you start to feel better.  Take over-the-counter and prescription medicines only as told by your health care provider.  Keep all follow-up visits. This is important.    Contact a health care provider if:  Your symptoms do not go away.  You develop new symptoms.    Get help right away if:  You have a fever that does not go away with treatment.  You develop chills.  You have extreme weakness, fainting, or dehydration.  You vomit repeatedly.  You develop severe pain in your abdomen.  You pass bloody or tarry stool.    Summary  Colitis is a condition in which the colon is inflamed. Colitis can last a short time (be acute), or it may last a long time (become chronic).  Treatment for this condition depends on the cause and may include resting the bowel, taking medicines, or having surgery.  If you were prescribed an antibiotic medicine, take it as told by your health care provider. Do not stop taking the antibiotic even if you start to feel better.  Get help right away if you develop severe pain in your abdomen.  Keep all follow-up visits. This is important.    ---------------------------------------------------------------------------------------------------    Cough    Coughing is a reflex that clears your throat and your airways. Coughing helps to heal and protect your lungs. It is normal to cough occasionally, but a cough that happens with other symptoms or lasts a long time may be a sign of a condition that needs treatment. Coughing may be caused by infections, asthma or COPD, smoking, postnasal drip, gastroesophageal reflux, as well as other medical conditions. Take medicines only as instructed by your health care provider. Avoid environments or triggers that causes you to cough at work or at home.    SEEK IMMEDIATE MEDICAL CARE IF YOU HAVE ANY OF THE FOLLOWING SYMPTOMS: coughing up blood, shortness of breath, rapid heart rate, chest pain, unexplained weight loss or night sweats.

## 2023-05-12 NOTE — ED PROVIDER NOTE - PATIENT PORTAL LINK FT
You can access the FollowMyHealth Patient Portal offered by [facility] by registering at the following website: [email or web address]. By joining [employer]’s FollowMyHealth portal, you will also be able to view your health information using other applications (apps) compatible with our system.

## 2023-05-13 LAB
CULTURE RESULTS: SIGNIFICANT CHANGE UP
SPECIMEN SOURCE: SIGNIFICANT CHANGE UP

## 2023-06-26 ENCOUNTER — APPOINTMENT (OUTPATIENT)
Dept: GASTROENTEROLOGY | Facility: CLINIC | Age: 33
End: 2023-06-26

## 2023-08-28 ENCOUNTER — EMERGENCY (EMERGENCY)
Facility: HOSPITAL | Age: 33
LOS: 0 days | Discharge: HOME | End: 2023-08-28
Attending: EMERGENCY MEDICINE
Payer: COMMERCIAL

## 2023-08-28 VITALS
WEIGHT: 160.06 LBS | OXYGEN SATURATION: 99 % | RESPIRATION RATE: 18 BRPM | SYSTOLIC BLOOD PRESSURE: 155 MMHG | TEMPERATURE: 98 F | HEART RATE: 99 BPM | HEIGHT: 65 IN | DIASTOLIC BLOOD PRESSURE: 97 MMHG

## 2023-08-28 DIAGNOSIS — R05.1 ACUTE COUGH: ICD-10-CM

## 2023-08-28 DIAGNOSIS — J02.9 ACUTE PHARYNGITIS, UNSPECIFIED: ICD-10-CM

## 2023-08-28 DIAGNOSIS — Z88.8 ALLERGY STATUS TO OTHER DRUGS, MEDICAMENTS AND BIOLOGICAL SUBSTANCES: ICD-10-CM

## 2023-08-28 DIAGNOSIS — R07.89 OTHER CHEST PAIN: ICD-10-CM

## 2023-08-28 DIAGNOSIS — R06.02 SHORTNESS OF BREATH: ICD-10-CM

## 2023-08-28 DIAGNOSIS — I49.8 OTHER SPECIFIED CARDIAC ARRHYTHMIAS: ICD-10-CM

## 2023-08-28 LAB
ALBUMIN SERPL ELPH-MCNC: 4.1 G/DL — SIGNIFICANT CHANGE UP (ref 3.5–5.2)
ALP SERPL-CCNC: 46 U/L — SIGNIFICANT CHANGE UP (ref 30–115)
ALT FLD-CCNC: 20 U/L — SIGNIFICANT CHANGE UP (ref 0–41)
ANION GAP SERPL CALC-SCNC: 13 MMOL/L — SIGNIFICANT CHANGE UP (ref 7–14)
AST SERPL-CCNC: 28 U/L — SIGNIFICANT CHANGE UP (ref 0–41)
BASOPHILS # BLD AUTO: 0.04 K/UL — SIGNIFICANT CHANGE UP (ref 0–0.2)
BASOPHILS NFR BLD AUTO: 0.6 % — SIGNIFICANT CHANGE UP (ref 0–1)
BILIRUB SERPL-MCNC: 0.7 MG/DL — SIGNIFICANT CHANGE UP (ref 0.2–1.2)
BUN SERPL-MCNC: 7 MG/DL — LOW (ref 10–20)
CALCIUM SERPL-MCNC: 9 MG/DL — SIGNIFICANT CHANGE UP (ref 8.4–10.5)
CHLORIDE SERPL-SCNC: 101 MMOL/L — SIGNIFICANT CHANGE UP (ref 98–110)
CO2 SERPL-SCNC: 25 MMOL/L — SIGNIFICANT CHANGE UP (ref 17–32)
CREAT SERPL-MCNC: 0.6 MG/DL — LOW (ref 0.7–1.5)
EGFR: 129 ML/MIN/1.73M2 — SIGNIFICANT CHANGE UP
EOSINOPHIL # BLD AUTO: 0.14 K/UL — SIGNIFICANT CHANGE UP (ref 0–0.7)
EOSINOPHIL NFR BLD AUTO: 2 % — SIGNIFICANT CHANGE UP (ref 0–8)
GLUCOSE SERPL-MCNC: 93 MG/DL — SIGNIFICANT CHANGE UP (ref 70–99)
HCT VFR BLD CALC: 40.2 % — LOW (ref 42–52)
HGB BLD-MCNC: 13.9 G/DL — LOW (ref 14–18)
IMM GRANULOCYTES NFR BLD AUTO: 0.6 % — HIGH (ref 0.1–0.3)
LYMPHOCYTES # BLD AUTO: 1.11 K/UL — LOW (ref 1.2–3.4)
LYMPHOCYTES # BLD AUTO: 16.1 % — LOW (ref 20.5–51.1)
MAGNESIUM SERPL-MCNC: 1.9 MG/DL — SIGNIFICANT CHANGE UP (ref 1.8–2.4)
MCHC RBC-ENTMCNC: 30.2 PG — SIGNIFICANT CHANGE UP (ref 27–31)
MCHC RBC-ENTMCNC: 34.6 G/DL — SIGNIFICANT CHANGE UP (ref 32–37)
MCV RBC AUTO: 87.2 FL — SIGNIFICANT CHANGE UP (ref 80–94)
MONOCYTES # BLD AUTO: 0.49 K/UL — SIGNIFICANT CHANGE UP (ref 0.1–0.6)
MONOCYTES NFR BLD AUTO: 7.1 % — SIGNIFICANT CHANGE UP (ref 1.7–9.3)
NEUTROPHILS # BLD AUTO: 5.07 K/UL — SIGNIFICANT CHANGE UP (ref 1.4–6.5)
NEUTROPHILS NFR BLD AUTO: 73.6 % — SIGNIFICANT CHANGE UP (ref 42.2–75.2)
NRBC # BLD: 0 /100 WBCS — SIGNIFICANT CHANGE UP (ref 0–0)
NT-PROBNP SERPL-SCNC: 15 PG/ML — SIGNIFICANT CHANGE UP (ref 0–300)
PLATELET # BLD AUTO: 345 K/UL — SIGNIFICANT CHANGE UP (ref 130–400)
PMV BLD: 8.9 FL — SIGNIFICANT CHANGE UP (ref 7.4–10.4)
POTASSIUM SERPL-MCNC: 4.1 MMOL/L — SIGNIFICANT CHANGE UP (ref 3.5–5)
POTASSIUM SERPL-SCNC: 4.1 MMOL/L — SIGNIFICANT CHANGE UP (ref 3.5–5)
PROT SERPL-MCNC: 6.5 G/DL — SIGNIFICANT CHANGE UP (ref 6–8)
RBC # BLD: 4.61 M/UL — LOW (ref 4.7–6.1)
RBC # FLD: 12.4 % — SIGNIFICANT CHANGE UP (ref 11.5–14.5)
SODIUM SERPL-SCNC: 139 MMOL/L — SIGNIFICANT CHANGE UP (ref 135–146)
TROPONIN T SERPL-MCNC: <0.01 NG/ML — SIGNIFICANT CHANGE UP
WBC # BLD: 6.89 K/UL — SIGNIFICANT CHANGE UP (ref 4.8–10.8)
WBC # FLD AUTO: 6.89 K/UL — SIGNIFICANT CHANGE UP (ref 4.8–10.8)

## 2023-08-28 PROCEDURE — 83735 ASSAY OF MAGNESIUM: CPT

## 2023-08-28 PROCEDURE — 84484 ASSAY OF TROPONIN QUANT: CPT

## 2023-08-28 PROCEDURE — 83880 ASSAY OF NATRIURETIC PEPTIDE: CPT

## 2023-08-28 PROCEDURE — 80053 COMPREHEN METABOLIC PANEL: CPT

## 2023-08-28 PROCEDURE — 94640 AIRWAY INHALATION TREATMENT: CPT

## 2023-08-28 PROCEDURE — 96374 THER/PROPH/DIAG INJ IV PUSH: CPT

## 2023-08-28 PROCEDURE — 71045 X-RAY EXAM CHEST 1 VIEW: CPT | Mod: 26

## 2023-08-28 PROCEDURE — 36415 COLL VENOUS BLD VENIPUNCTURE: CPT

## 2023-08-28 PROCEDURE — 99285 EMERGENCY DEPT VISIT HI MDM: CPT | Mod: 25

## 2023-08-28 PROCEDURE — 93010 ELECTROCARDIOGRAM REPORT: CPT

## 2023-08-28 PROCEDURE — 85025 COMPLETE CBC W/AUTO DIFF WBC: CPT

## 2023-08-28 PROCEDURE — 99285 EMERGENCY DEPT VISIT HI MDM: CPT

## 2023-08-28 PROCEDURE — 71045 X-RAY EXAM CHEST 1 VIEW: CPT

## 2023-08-28 PROCEDURE — 93005 ELECTROCARDIOGRAM TRACING: CPT

## 2023-08-28 RX ORDER — SODIUM CHLORIDE 9 MG/ML
1000 INJECTION, SOLUTION INTRAVENOUS ONCE
Refills: 0 | Status: COMPLETED | OUTPATIENT
Start: 2023-08-28 | End: 2023-08-28

## 2023-08-28 RX ORDER — IPRATROPIUM/ALBUTEROL SULFATE 18-103MCG
3 AEROSOL WITH ADAPTER (GRAM) INHALATION ONCE
Refills: 0 | Status: COMPLETED | OUTPATIENT
Start: 2023-08-28 | End: 2023-08-28

## 2023-08-28 RX ORDER — DEXAMETHASONE 0.5 MG/5ML
10 ELIXIR ORAL ONCE
Refills: 0 | Status: COMPLETED | OUTPATIENT
Start: 2023-08-28 | End: 2023-08-28

## 2023-08-28 RX ORDER — ALBUTEROL 90 UG/1
1 AEROSOL, METERED ORAL ONCE
Refills: 0 | Status: COMPLETED | OUTPATIENT
Start: 2023-08-28 | End: 2023-08-28

## 2023-08-28 RX ADMIN — Medication 3 MILLILITER(S): at 11:38

## 2023-08-28 RX ADMIN — ALBUTEROL 1 PUFF(S): 90 AEROSOL, METERED ORAL at 12:40

## 2023-08-28 RX ADMIN — Medication 102 MILLIGRAM(S): at 11:38

## 2023-08-28 RX ADMIN — SODIUM CHLORIDE 1000 MILLILITER(S): 9 INJECTION, SOLUTION INTRAVENOUS at 11:38

## 2023-08-28 NOTE — ED PROVIDER NOTE - CARE PLAN
Assessment and plan of treatment:	Plan: Monitor, EKG, CXR, labs, duo nebs, steroid, reassess.   1 Principal Discharge DX:	Cough  Assessment and plan of treatment:	Plan: Monitor, EKG, CXR, labs, duo nebs, steroid, reassess.  Secondary Diagnosis:	Sore throat

## 2023-08-28 NOTE — ED PROVIDER NOTE - NS ED ROS FT
Constitutional:  No behavior changes, fevers/chills.   Head: No injury, headache, LOC, dizziness/LH.   Eyes:  No visual changes, eye pain, redness, or discharge; no injury; no foreign body sensation.   ENMT:  No ear pain or discharge, hearing problems; no pain or injuries to the nose, ears, mouth, or throat.   Neck:  No pain, stiffness, injury; no loss of range of motion.   Cardiac: No palpitations, diaphoresis. (+) chest tightness   Chest/respiratory: no injuries. (+) sob, (+) chest tightness   GI: No nausea, vomiting, diarrhea or abdominal pain; no injuries. No changes in PO intake. No melena/brbpr.   :  No dysuria, hematuria, urgency, or injuries. No changes in urine output. No flanl    MS: No pain, weakness, injury, numbness or tingling; no loss of range of motion. No edema. No calf pain/swelling/erythema.   Back:  No pain or injury.   Skin:  No rashes or color changes; no lacerations or abrasions.  Social: No sick contacts, recent travel or rash.

## 2023-08-28 NOTE — ED PROVIDER NOTE - PHYSICAL EXAMINATION
On exam:   Constitutional: non-toxic appearing, speaking full sentences, in no acute distress.  no sniffing position, no hot potato/ muffled voice.   SKIN: No rash.   HEENT: PERRL, conjunctiva and sclera clear. No injection, discharge or pallor. TM's visualized b/l with good cone of light, no erythema or effusions. No rhinorrhea. MMM, no enlarged tonsils, No erythema, exudates or petechiae. Uvula midline. No drooling/secretions, no strawberry tongue,  No PTA. No trismus. No malocclusion. No TMJ pain. No elevation of floor of mouth/ no pain to palpation of floor of mouth. No oropharyngeal edema. No anterior neck pain.   NECK: Neck supple, no meningeal signs, no torticollis.   CARDIO: RRR. Radial pulses 2/4 /bl. Cap refill < 2 seconds.   LUNGS: No congestion. Breaths sounds present b/l. Expiratory wheezing present b/l. Poor air exchange. No accessory muscle use/retractions. No stridor.   ABD: BS present throughout all 4 quadrants; soft; non-distended; non-tender; no rebound tenderness/guarding, no cvat, no hepatosplenomegaly. No palpable masses. EXT: Moving all ext. No acute LAD.   NEURO: AAOx3, no focal deficits.

## 2023-08-28 NOTE — ED ADULT NURSE NOTE - DOES PATIENT HAVE ADVANCE DIRECTIVE
Detail Level: Detailed No Add 93067 Cpt? (Important Note: In 2017 The Use Of 70027 Is Being Tracked By Cms To Determine Future Global Period Reimbursement For Global Periods): yes

## 2023-08-28 NOTE — ED PROVIDER NOTE - DIFFERENTIAL DIAGNOSIS
pharyngitis, pta, pna : viral vs bacterial, bronchitis Differential Diagnosis pharyngitis, pta, pna : viral vs bacterial, bronchitis. bronchospasm

## 2023-08-28 NOTE — ED PROVIDER NOTE - CLINICAL SUMMARY MEDICAL DECISION MAKING FREE TEXT BOX
33-year-old male with no past medical history, denies smoking, presents with sore throat for the past 2 weeks, throbbing, mild, nonradiating, worse with swallowing, better at rest, associated with 1 week of dry cough and shortness of breath, as well as chest tightness from the shortness of breath and cough.  Patient reports his house is under construction so has been surrounded by a lot of dust and also works for sanitation.  No family history of cardiac disease, no family history of PE, DVT, or clotting disorders.  No hemoptysis, no recent surgeries, no recent travel.  No fever, chills, nausea, vomiting, drooling/secretions, trismus, dysphagia/ odynophagia, muffled/hot potato voice, pleuritic chest pain, palpitations, diaphoresis, resp distress, weakness/unusual behavior,  ear pain, abd pain, diarrhea, constipation, urinary symptoms, decreased po intake, sick contacts, recent travel, or rash.    On exam:   Constitutional: non-toxic appearing, speaking full sentences, in no acute distress.  no sniffing position, no hot potato/ muffled voice.   SKIN: No rash.   HEENT: PERRL, conjunctiva and sclera clear. No injection, discharge or pallor. TM's visualized b/l with good cone of light, no erythema or effusions. No rhinorrhea. MMM, no enlarged tonsils, No erythema, exudates or petechiae. Uvula midline. No drooling/secretions, no strawberry tongue,  No PTA. No trismus. No malocclusion. No TMJ pain. No elevation of floor of mouth/ no pain to palpation of floor of mouth. No oropharyngeal edema. No anterior neck pain.   NECK: Neck supple, no meningeal signs, no torticollis.   CARDIO: RRR. Radial pulses 2/4 /bl. Cap refill < 2 seconds.   LUNGS: No congestion. Breaths sounds present b/l. Expiratory wheezing present b/l. Poor air exchange. No accessory muscle use/retractions. No stridor.   ABD: BS present throughout all 4 quadrants; soft; non-distended; non-tender; no rebound tenderness/guarding, no cvat, no hepatosplenomegaly. No palpable masses. EXT: Moving all ext. No acute LAD.   NEURO: AAOx3, no focal deficits.    Labs and EKG were ordered and reviewed.  Imaging was ordered and reviewed by me.  Appropriate medications for patient's presenting complaints were ordered and effects were reassessed.  Patient's records (prior ED visi) were reviewed.  Additional history was obtained from mother. 33-year-old male with no past medical history, denies smoking, presents with sore throat for the past 2 weeks, throbbing, mild, nonradiating, worse with swallowing, better at rest, associated with 1 week of dry cough and shortness of breath, as well as chest tightness from the shortness of breath and cough.  Patient reports his house is under construction so has been surrounded by a lot of dust and also works for sanitation.  No family history of cardiac disease, no family history of PE, DVT, or clotting disorders.  No hemoptysis, no recent surgeries, no recent travel.  No fever, chills, nausea, vomiting, drooling/secretions, trismus, dysphagia/ odynophagia, muffled/hot potato voice, pleuritic chest pain, palpitations, diaphoresis, resp distress, weakness/unusual behavior,  ear pain, abd pain, diarrhea, constipation, urinary symptoms, decreased po intake, sick contacts, recent travel, or rash.    On exam:   Constitutional: non-toxic appearing, speaking full sentences, in no acute distress.  no sniffing position, no hot potato/ muffled voice.   SKIN: No rash.   HEENT: PERRL, conjunctiva and sclera clear. No injection, discharge or pallor. TM's visualized b/l with good cone of light, no erythema or effusions. No rhinorrhea. MMM, no enlarged tonsils, No erythema, exudates or petechiae. Uvula midline. No drooling/secretions, no strawberry tongue,  No PTA. No trismus. No malocclusion. No TMJ pain. No elevation of floor of mouth/ no pain to palpation of floor of mouth. No oropharyngeal edema. No anterior neck pain.   NECK: Neck supple, no meningeal signs, no torticollis.   CARDIO: RRR. Radial pulses 2/4 /bl. Cap refill < 2 seconds.   LUNGS: No congestion. Breaths sounds present b/l. Expiratory wheezing present b/l. Poor air exchange. No accessory muscle use/retractions. No stridor.   ABD: BS present throughout all 4 quadrants; soft; non-distended; non-tender; no rebound tenderness/guarding, no cvat, no hepatosplenomegaly. No palpable masses. EXT: Moving all ext. No acute LAD.   NEURO: AAOx3, no focal deficits.    Labs and EKG were ordered and reviewed.  Imaging was ordered and reviewed by me.  Appropriate medications for patient's presenting complaints were ordered and effects were reassessed.  Patient's records (prior ED visi) were reviewed.  Additional history was obtained from mother. Escalation to admission/observation was considered. However patient feels much better and is comfortable with discharge.  Appropriate follow-up was arranged.  Supportive care and home care discussed in detail. Patient aware they may have to return for re-evaluation and possible admission if outpatient treatment fails. Strict return precautions discussed. Lung reexamination clear to auscultation bilaterally, no wheezing, no rhonchi, saturation 99 at rest and with exertion, patient reports feeling much better, aware of all results, understands symptomatic treatment, signs and symptoms to return for, follow-up with PMD and pulmonologist.  Reports will follow-up.

## 2023-08-28 NOTE — ED PROVIDER NOTE - PROGRESS NOTE DETAILS
ED Attending JOURDAN Gaming  Lung reexamination clear to auscultation bilaterally, no wheezing, no rhonchi, saturation 99 at rest and with exertion, patient reports feeling much better, aware of all results, understands symptomatic treatment, signs and symptoms to return for, follow-up with PMD and pulmonologist.  Reports will follow-up.

## 2023-08-28 NOTE — ED PROVIDER NOTE - NSFOLLOWUPINSTRUCTIONS_ED_ALL_ED_FT
Our Emergency Department Referral Coordinators will be reaching out to you in the next 24-48 hours from 9:00am to 5:00pm with a follow up appointment. Please expect a phone call from the hospital in that time frame. If you do not receive a call or if you have any questions or concerns, you can reach them at   [phone].    Cough    Coughing is a reflex that clears your throat and your airways. Coughing helps to heal and protect your lungs. It is normal to cough occasionally, but a cough that happens with other symptoms or lasts a long time may be a sign of a condition that needs treatment. Coughing may be caused by infections, asthma or COPD, smoking, postnasal drip, gastroesophageal reflux, as well as other medical conditions. Take medicines only as instructed by your health care provider. Avoid environments or triggers that causes you to cough at work or at home.    SEEK IMMEDIATE MEDICAL CARE IF YOU HAVE ANY OF THE FOLLOWING SYMPTOMS: coughing up blood, shortness of breath, rapid heart rate, chest pain, unexplained weight loss or night sweats.    Pharyngitis    WHAT YOU NEED TO KNOW:    Pharyngitis, or sore throat, is inflammation of the tissues and structures in your pharynx (throat). Pharyngitis is most often caused by bacteria. It may also be caused by a cold or flu virus. Other causes include smoking, allergies, or acid reflux.     DISCHARGE INSTRUCTIONS:    Call 911 for any of the following:     You have trouble breathing or swallowing because your throat is swollen or sore.        Return to the emergency department if:     You are drooling because it hurts too much to swallow.      Your fever is higher than 102°F (39°C) or lasts longer than 3 days.      You are confused.      You taste blood in your throat.    Contact your healthcare provider if:     Your throat pain gets worse.      You have a painful lump in your throat that does not go away after 5 days.      Your symptoms do not improve after 5 days.      You have questions or concerns about your condition or care.    Medicines: Viral pharyngitis will go away on its own without treatment. Your sore throat should start to feel better in 3 to 5 days for both viral and bacterial infections. You may need any of the following:     Antibiotics treat a bacterial infection.      NSAIDs, such as ibuprofen, help decrease swelling, pain, and fever. NSAIDs can cause stomach bleeding or kidney problems in certain people. If you take blood thinner medicine, always ask your healthcare provider if NSAIDs are safe for you. Always read the medicine label and follow directions.      Acetaminophen decreases pain and fever. It is available without a doctor's order. Ask how much to take and how often to take it. Follow directions. Acetaminophen can cause liver damage if not taken correctly.      Take your medicine as directed. Contact your healthcare provider if you think your medicine is not helping or if you have side effects. Tell him or her if you are allergic to any medicine. Keep a list of the medicines, vitamins, and herbs you take. Include the amounts, and when and why you take them. Bring the list or the pill bottles to follow-up visits. Carry your medicine list with you in case of an emergency.    Manage your symptoms:     Gargle salt water. Mix ¼ teaspoon salt in an 8 ounce glass of warm water and gargle. This may help decrease swelling in your throat.      Drink liquids as directed. You may need to drink more liquids than usual. Liquids may help soothe your throat and prevent dehydration. Ask how much liquid to drink each day and which liquids are best for you.      Use a cool-steam humidifier to help moisten the air in your room and calm your cough.      Soothe your throat with cough drops, ice, soft foods, or popsicles.    Prevent the spread of pharyngitis: Cover your mouth and nose when you cough or sneeze. Do not share food or drinks. Wash your hands often. Use soap and water. If soap and water are unavailable, use an alcohol based hand .     Follow up with your healthcare provider as directed: Write down your questions so you remember to ask them during your visits.        © Copyright [employer] 2019 All illustrations and images included in CareNotes are the copyrighted property of [employer] or [employer].

## 2023-08-28 NOTE — ED PROVIDER NOTE - EKG ADDITIONAL INFORMATION FREE TEXT
Normal sinus with sinus arrhythmia at 82 bpm, CO interval 158, QRS 78, QTc 450, no ST elevations or depressions.

## 2023-08-28 NOTE — ED PROVIDER NOTE - OBJECTIVE STATEMENT
33-year-old male with no past medical history, denies smoking, presents with sore throat for the past 2 weeks, throbbing, mild, nonradiating, worse with swallowing, better at rest, associated with 1 week of dry cough and shortness of breath, as well as chest tightness from the shortness of breath and cough.  Patient reports his house is under construction so has been surrounded by a lot of dust and also works for sanitation.  No family history of cardiac disease, no family history of PE, DVT, or clotting disorders.  No hemoptysis, no recent surgeries, no recent travel.  No fever, chills, nausea, vomiting, drooling/secretions, trismus, dysphagia/ odynophagia, muffled/hot potato voice, pleuritic chest pain, palpitations, diaphoresis, resp distress, weakness/unusual behavior,  ear pain, abd pain, diarrhea, constipation, urinary symptoms, decreased po intake, sick contacts, recent travel, or rash.

## 2023-08-28 NOTE — ED PROVIDER NOTE - PATIENT PORTAL LINK FT
You can access the FollowMyHealth Patient Portal offered by [facility] by registering at the following website: [email or web address]. By joining [employer]’s FollowMyHealth portal, you will also be able to view your health information using other applications (apps) compatible with our system.

## 2024-05-29 ENCOUNTER — EMERGENCY (EMERGENCY)
Facility: HOSPITAL | Age: 34
LOS: 0 days | Discharge: HOME | End: 2024-05-29
Attending: EMERGENCY MEDICINE
Payer: COMMERCIAL

## 2024-05-29 VITALS
HEART RATE: 106 BPM | RESPIRATION RATE: 20 BRPM | HEIGHT: 65 IN | OXYGEN SATURATION: 98 % | TEMPERATURE: 98 F | SYSTOLIC BLOOD PRESSURE: 163 MMHG | WEIGHT: 179.9 LBS | DIASTOLIC BLOOD PRESSURE: 103 MMHG

## 2024-05-29 VITALS — DIASTOLIC BLOOD PRESSURE: 80 MMHG | HEART RATE: 109 BPM | SYSTOLIC BLOOD PRESSURE: 133 MMHG

## 2024-05-29 DIAGNOSIS — Z01.89 ENCOUNTER FOR OTHER SPECIFIED SPECIAL EXAMINATIONS: ICD-10-CM

## 2024-05-29 DIAGNOSIS — Z88.8 ALLERGY STATUS TO OTHER DRUGS, MEDICAMENTS AND BIOLOGICAL SUBSTANCES: ICD-10-CM

## 2024-05-29 PROCEDURE — 99282 EMERGENCY DEPT VISIT SF MDM: CPT

## 2024-05-29 PROCEDURE — 99283 EMERGENCY DEPT VISIT LOW MDM: CPT

## 2024-05-29 NOTE — ED PROVIDER NOTE - PHYSICAL EXAMINATION
VITAL SIGNS: I have reviewed nursing notes and confirm.  CONSTITUTIONAL: Well-developed; well-nourished; in no acute distress.  SKIN: Skin exam is warm and dry, no acute rash, no piloerection, good turgor  HEAD: Normocephalic; atraumatic.  EYES: PERRL, not pinpoint, EOM intact; conjunctiva and sclera clear.  ENT: No nasal discharge; airway clear.   CARD: S1, S2 normal; no murmurs, gallops, or rubs. Regular rate and rhythm.  RESP: No wheezes, rales or rhonchi, not bradypneic  ABD: Normal bowel sounds; soft; non-distended; non-tender  EXT: Normal ROM  NEURO: Alert, oriented. Grossly unremarkable. No focal deficits.  PSYCH: Cooperative, appropriate.

## 2024-05-29 NOTE — ED PROVIDER NOTE - PATIENT PORTAL LINK FT
You can access the FollowMyHealth Patient Portal offered by [facility] by registering at the following website: [email or web address]. By joining [employer]’s FollowMyHealth portal, you will also be able to view your health information using other applications (apps) compatible with our system.

## 2024-05-29 NOTE — ED ADULT NURSE NOTE - NSFALLUNIVINTERV_ED_ALL_ED
Bed/Stretcher in lowest position, wheels locked, appropriate side rails in place/Call bell, personal items and telephone in reach/Instruct patient to call for assistance before getting out of bed/chair/stretcher/Non-slip footwear applied when patient is off stretcher/[place] to call system/Physically safe environment - no spills, clutter or unnecessary equipment/Purposeful proactive rounding/Room/bathroom lighting operational, light cord in reach
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
## 2025-04-23 ENCOUNTER — EMERGENCY (EMERGENCY)
Facility: HOSPITAL | Age: 35
LOS: 0 days | Discharge: HOME | End: 2025-04-23
Attending: EMERGENCY MEDICINE
Payer: COMMERCIAL

## 2025-04-23 VITALS
OXYGEN SATURATION: 99 % | DIASTOLIC BLOOD PRESSURE: 82 MMHG | HEIGHT: 65 IN | SYSTOLIC BLOOD PRESSURE: 124 MMHG | RESPIRATION RATE: 18 BRPM | TEMPERATURE: 98 F | HEART RATE: 75 BPM | WEIGHT: 175.05 LBS

## 2025-04-23 DIAGNOSIS — J06.9 ACUTE UPPER RESPIRATORY INFECTION, UNSPECIFIED: ICD-10-CM

## 2025-04-23 DIAGNOSIS — R68.83 CHILLS (WITHOUT FEVER): ICD-10-CM

## 2025-04-23 DIAGNOSIS — R53.1 WEAKNESS: ICD-10-CM

## 2025-04-23 DIAGNOSIS — R07.89 OTHER CHEST PAIN: ICD-10-CM

## 2025-04-23 DIAGNOSIS — J34.89 OTHER SPECIFIED DISORDERS OF NOSE AND NASAL SINUSES: ICD-10-CM

## 2025-04-23 LAB
ALBUMIN SERPL ELPH-MCNC: 3.8 G/DL — SIGNIFICANT CHANGE UP (ref 3.5–5.2)
ALP SERPL-CCNC: 46 U/L — SIGNIFICANT CHANGE UP (ref 30–115)
ALT FLD-CCNC: 8 U/L — SIGNIFICANT CHANGE UP (ref 0–41)
ANION GAP SERPL CALC-SCNC: 10 MMOL/L — SIGNIFICANT CHANGE UP (ref 7–14)
AST SERPL-CCNC: 13 U/L — SIGNIFICANT CHANGE UP (ref 0–41)
BASOPHILS # BLD AUTO: 0.03 K/UL — SIGNIFICANT CHANGE UP (ref 0–0.2)
BASOPHILS NFR BLD AUTO: 0.5 % — SIGNIFICANT CHANGE UP (ref 0–1)
BILIRUB SERPL-MCNC: 0.6 MG/DL — SIGNIFICANT CHANGE UP (ref 0.2–1.2)
BUN SERPL-MCNC: 10 MG/DL — SIGNIFICANT CHANGE UP (ref 10–20)
CALCIUM SERPL-MCNC: 9.1 MG/DL — SIGNIFICANT CHANGE UP (ref 8.4–10.5)
CHLORIDE SERPL-SCNC: 105 MMOL/L — SIGNIFICANT CHANGE UP (ref 98–110)
CO2 SERPL-SCNC: 25 MMOL/L — SIGNIFICANT CHANGE UP (ref 17–32)
CREAT SERPL-MCNC: 0.9 MG/DL — SIGNIFICANT CHANGE UP (ref 0.7–1.5)
EGFR: 113 ML/MIN/1.73M2 — SIGNIFICANT CHANGE UP
EGFR: 113 ML/MIN/1.73M2 — SIGNIFICANT CHANGE UP
EOSINOPHIL # BLD AUTO: 0.21 K/UL — SIGNIFICANT CHANGE UP (ref 0–0.7)
EOSINOPHIL NFR BLD AUTO: 3.8 % — SIGNIFICANT CHANGE UP (ref 0–8)
GLUCOSE SERPL-MCNC: 97 MG/DL — SIGNIFICANT CHANGE UP (ref 70–99)
HCT VFR BLD CALC: 42 % — SIGNIFICANT CHANGE UP (ref 42–52)
HGB BLD-MCNC: 14.6 G/DL — SIGNIFICANT CHANGE UP (ref 14–18)
IMM GRANULOCYTES NFR BLD AUTO: 0.4 % — HIGH (ref 0.1–0.3)
LACTATE SERPL-SCNC: 0.8 MMOL/L — SIGNIFICANT CHANGE UP (ref 0.7–2)
LYMPHOCYTES # BLD AUTO: 1.05 K/UL — LOW (ref 1.2–3.4)
LYMPHOCYTES # BLD AUTO: 18.9 % — LOW (ref 20.5–51.1)
MAGNESIUM SERPL-MCNC: 1.8 MG/DL — SIGNIFICANT CHANGE UP (ref 1.8–2.4)
MCHC RBC-ENTMCNC: 30.5 PG — SIGNIFICANT CHANGE UP (ref 27–31)
MCHC RBC-ENTMCNC: 34.8 G/DL — SIGNIFICANT CHANGE UP (ref 32–37)
MCV RBC AUTO: 87.9 FL — SIGNIFICANT CHANGE UP (ref 80–94)
MONOCYTES # BLD AUTO: 0.49 K/UL — SIGNIFICANT CHANGE UP (ref 0.1–0.6)
MONOCYTES NFR BLD AUTO: 8.8 % — SIGNIFICANT CHANGE UP (ref 1.7–9.3)
NEUTROPHILS # BLD AUTO: 3.75 K/UL — SIGNIFICANT CHANGE UP (ref 1.4–6.5)
NEUTROPHILS NFR BLD AUTO: 67.6 % — SIGNIFICANT CHANGE UP (ref 42.2–75.2)
NRBC BLD AUTO-RTO: 0 /100 WBCS — SIGNIFICANT CHANGE UP (ref 0–0)
PLATELET # BLD AUTO: 244 K/UL — SIGNIFICANT CHANGE UP (ref 130–400)
PMV BLD: 9.3 FL — SIGNIFICANT CHANGE UP (ref 7.4–10.4)
POTASSIUM SERPL-MCNC: 3.9 MMOL/L — SIGNIFICANT CHANGE UP (ref 3.5–5)
POTASSIUM SERPL-SCNC: 3.9 MMOL/L — SIGNIFICANT CHANGE UP (ref 3.5–5)
PROT SERPL-MCNC: 6.1 G/DL — SIGNIFICANT CHANGE UP (ref 6–8)
RBC # BLD: 4.78 M/UL — SIGNIFICANT CHANGE UP (ref 4.7–6.1)
RBC # FLD: 12.5 % — SIGNIFICANT CHANGE UP (ref 11.5–14.5)
SODIUM SERPL-SCNC: 140 MMOL/L — SIGNIFICANT CHANGE UP (ref 135–146)
TROPONIN T, HIGH SENSITIVITY RESULT: <6 NG/L — SIGNIFICANT CHANGE UP (ref 6–21)
WBC # BLD: 5.55 K/UL — SIGNIFICANT CHANGE UP (ref 4.8–10.8)
WBC # FLD AUTO: 5.55 K/UL — SIGNIFICANT CHANGE UP (ref 4.8–10.8)

## 2025-04-23 PROCEDURE — 80053 COMPREHEN METABOLIC PANEL: CPT

## 2025-04-23 PROCEDURE — 71045 X-RAY EXAM CHEST 1 VIEW: CPT | Mod: 26

## 2025-04-23 PROCEDURE — 83735 ASSAY OF MAGNESIUM: CPT

## 2025-04-23 PROCEDURE — 71045 X-RAY EXAM CHEST 1 VIEW: CPT

## 2025-04-23 PROCEDURE — 83605 ASSAY OF LACTIC ACID: CPT

## 2025-04-23 PROCEDURE — 85025 COMPLETE CBC W/AUTO DIFF WBC: CPT

## 2025-04-23 PROCEDURE — 84484 ASSAY OF TROPONIN QUANT: CPT

## 2025-04-23 PROCEDURE — 99285 EMERGENCY DEPT VISIT HI MDM: CPT

## 2025-04-23 PROCEDURE — 36415 COLL VENOUS BLD VENIPUNCTURE: CPT

## 2025-04-23 PROCEDURE — 99285 EMERGENCY DEPT VISIT HI MDM: CPT | Mod: 25

## 2025-04-23 PROCEDURE — 93010 ELECTROCARDIOGRAM REPORT: CPT

## 2025-04-23 PROCEDURE — 93005 ELECTROCARDIOGRAM TRACING: CPT

## 2025-04-23 RX ORDER — KETOROLAC TROMETHAMINE 30 MG/ML
15 INJECTION, SOLUTION INTRAMUSCULAR; INTRAVENOUS ONCE
Refills: 0 | Status: COMPLETED | OUTPATIENT
Start: 2025-04-23 | End: 2025-04-23

## 2025-04-23 NOTE — ED PROVIDER NOTE - NSFOLLOWUPINSTRUCTIONS_ED_ALL_ED_FT
Our Emergency Department Referral Coordinators will be reaching out to you in the next 24-48 hours from 9:00am to 5:00pm with a follow up appointment. Please expect a phone call from the hospital in that time frame. If you do not receive a call or if you have any questions or concerns, you can reach them at [phone].    Chest Pain    Chest pain can be caused by many different conditions which may or may not be dangerous. Causes include heartburn, lung infections, heart attack, blood clot in lungs, skin infections, strain or damage to muscle, cartilage, or bones, etc. In addition to a history and physical examination, an electrocardiogram (ECG) or other lab tests may have been performed to determine the cause of your chest pain. Follow up with your primary care provider or with a cardiologist as instructed.     SEEK IMMEDIATE MEDICAL CARE IF YOU HAVE ANY OF THE FOLLOWING SYMPTOMS: worsening chest pain, coughing up blood, unexplained back/neck/jaw pain, severe abdominal pain, dizziness or lightheadedness, fainting, shortness of breath, sweaty or clammy skin, vomiting, or racing heart beat. These symptoms may represent a serious problem that is an emergency. Do not wait to see if the symptoms will go away. Get medical help right away. Call 911 and do not drive yourself to the hospital.    Viral Respiratory Infection    A viral respiratory infection is an illness that affects parts of the body used for breathing, like the lungs, nose, and throat. It is caused by a germ called a virus. Symptoms can include runny nose, coughing, sneezing, fatigue, body aches, sore throat, fever, or headache. Over the counter medicine can be used to manage the symptoms but the infection typically goes away on its own in 5 to 10 days.     SEEK IMMEDIATE MEDICAL CARE IF YOU HAVE ANY OF THE FOLLOWING SYMPTOMS: shortness of breath, chest pain, fever over 10 days, or lightheadedness/dizziness.

## 2025-04-23 NOTE — ED PROVIDER NOTE - PATIENT PORTAL LINK FT
You can access the FollowMyHealth Patient Portal offered by [facility] by registering at the following website: [email or web address]. By joining [employer]’s FollowMyHealth portal, you will also be able to view your health information using other applications (apps) compatible with our system.

## 2025-04-23 NOTE — ED PROVIDER NOTE - ATTENDING APP SHARED VISIT CONTRIBUTION OF CARE
35-year-old male with no past medical history, no smoking or drug use, presents with mother with chest discomfort. Reports chest discomfort sometimes at one or another side of his chest, worsened with deep breath and with movement of his torso, since this morning. Associated rhinorrhea, congestion, cough, subjective fever since yesterday. Associated diarrhea and generalized weakness today. Denies all other symptoms including leg pain or swelling, hemoptysis, vomiting, recent long distance travel, hormone use, shortness of breath. On exam, afebrile, hemodynamically stable, saturating well on room air, NAD, well appearing, sitting comfortably in bed, no WOB/tachypnea, speaking full sentences, head NCAT, EOMI grossly, anicteric, MMM, no JVD or HJR, RRR, nml S1/S2, no m/r/g, lungs CTAB, no w/r/r, abd soft, NT, ND, nml BS, no rebound or guarding, AAO, CN's 3-12 grossly intact, WARE spontaneously, no leg cyanosis or edema or calf tenderness, 2+ symmetric radial pulses, skin warm, well perfused, no rashes or hives. No abdominal pain or tenderness, with low suspicion for acute intra-abdominal process to warrant abdominal imaging. Character low suspicion for dissection and no murmur or pulse asymmetry to warrant CT angio imaging to rule out dissection. Character low suspicion for PE and PERC negative and does not warrant CT angio imaging to rule this out. Character low suspicion for ACS and ECG unremarkable and trop [____________] and with HEART score of 1 does not warrant cardiology consult or admission for cardiac work-up at this time. No e/o PNA, PTX, or fluid overload on exam or CXR, and does not warrant antibiotics or diuretics at this time. Character could be consistent with viral syndrome. Given IV fluids, Toradol 35-year-old male with no past medical history, no smoking or drug use, presents with mother with chest discomfort. Reports chest discomfort sometimes at one or another side of his chest, worsened with deep breath and with movement of his torso, since this morning. Associated rhinorrhea, congestion, cough, subjective fever since yesterday. Associated diarrhea and generalized weakness today. Denies all other symptoms including leg pain or swelling, hemoptysis, vomiting, recent long distance travel, hormone use, shortness of breath. On exam, afebrile, hemodynamically stable, saturating well on room air, NAD, well appearing, sitting comfortably in bed, no WOB/tachypnea, speaking full sentences, head NCAT, EOMI grossly, anicteric, MMM, no JVD or HJR, RRR, nml S1/S2, no m/r/g, lungs CTAB, no w/r/r, abd soft, NT, ND, nml BS, no rebound or guarding, AAO, CN's 3-12 grossly intact, WARE spontaneously, no leg cyanosis or edema or calf tenderness, 2+ symmetric radial pulses, skin warm, well perfused, no rashes or hives. No abdominal pain or tenderness, with low suspicion for acute intra-abdominal process to warrant abdominal imaging. Character low suspicion for dissection and no murmur or pulse asymmetry to warrant CT angio imaging to rule out dissection. Character low suspicion for PE and PERC negative and does not warrant CT angio imaging to rule this out. Character low suspicion for ACS and ECG unremarkable and trop unremarkable and with HEART score of 1 does not warrant cardiology consult or admission for cardiac work-up at this time. No e/o PNA, PTX, or fluid overload on exam or CXR, and does not warrant antibiotics or diuretics at this time. Character could be consistent with viral syndrome. Given IV fluids, Toradol with improvement. Patient is well appearing, NAD, afebrile, hemodynamically stable. Any available tests and studies were discussed with patient and mother. Discharged with instructions in further symptomatic care, return precautions, and need for f/u.

## 2025-04-23 NOTE — ED PROVIDER NOTE - OBJECTIVE STATEMENT
35-year-old male no past medical history presents to the ED complaining of left-sided chest pain with sweating and chills since yesterday.  Patient denies any shortness of breath, cough, fever, chills, leg pain or leg swelling.

## 2025-04-23 NOTE — ED PROVIDER NOTE - CLINICAL SUMMARY MEDICAL DECISION MAKING FREE TEXT BOX
35-year-old male with no past medical history, no smoking or drug use, presents with mother with chest discomfort. Reports chest discomfort sometimes at one or another side of his chest, worsened with deep breath and with movement of his torso, since this morning. Associated rhinorrhea, congestion, cough, subjective fever since yesterday. Associated diarrhea and generalized weakness today. Denies all other symptoms including leg pain or swelling, hemoptysis, vomiting, recent long distance travel, hormone use, shortness of breath. On exam, afebrile, hemodynamically stable, saturating well on room air, NAD, well appearing, sitting comfortably in bed, no WOB/tachypnea, speaking full sentences, head NCAT, EOMI grossly, anicteric, MMM, no JVD or HJR, RRR, nml S1/S2, no m/r/g, lungs CTAB, no w/r/r, abd soft, NT, ND, nml BS, no rebound or guarding, AAO, CN's 3-12 grossly intact, WARE spontaneously, no leg cyanosis or edema or calf tenderness, 2+ symmetric radial pulses, skin warm, well perfused, no rashes or hives. No abdominal pain or tenderness, with low suspicion for acute intra-abdominal process to warrant abdominal imaging. Character low suspicion for dissection and no murmur or pulse asymmetry to warrant CT angio imaging to rule out dissection. Character low suspicion for PE and PERC negative and does not warrant CT angio imaging to rule this out. Character low suspicion for ACS and ECG unremarkable and trop unremarkable and with HEART score of 1 does not warrant cardiology consult or admission for cardiac work-up at this time. No e/o PNA, PTX, or fluid overload on exam or CXR, and does not warrant antibiotics or diuretics at this time. Character could be consistent with viral syndrome. Given IV fluids, Toradol with improvement. Patient is well appearing, NAD, afebrile, hemodynamically stable. Any available tests and studies were discussed with patient and mother. Discharged with instructions in further symptomatic care, return precautions, and need for f/u.

## 2025-08-26 ENCOUNTER — EMERGENCY (EMERGENCY)
Facility: HOSPITAL | Age: 35
LOS: 0 days | Discharge: HOME | End: 2025-08-26
Attending: STUDENT IN AN ORGANIZED HEALTH CARE EDUCATION/TRAINING PROGRAM
Payer: COMMERCIAL

## 2025-08-26 VITALS
OXYGEN SATURATION: 96 % | TEMPERATURE: 98 F | HEART RATE: 92 BPM | RESPIRATION RATE: 18 BRPM | DIASTOLIC BLOOD PRESSURE: 81 MMHG | WEIGHT: 190.48 LBS | SYSTOLIC BLOOD PRESSURE: 133 MMHG

## 2025-08-26 PROCEDURE — 71045 X-RAY EXAM CHEST 1 VIEW: CPT

## 2025-08-26 PROCEDURE — 73030 X-RAY EXAM OF SHOULDER: CPT | Mod: 26,LT

## 2025-08-26 PROCEDURE — 71045 X-RAY EXAM CHEST 1 VIEW: CPT | Mod: 26

## 2025-08-26 PROCEDURE — 99284 EMERGENCY DEPT VISIT MOD MDM: CPT | Mod: 25

## 2025-08-26 PROCEDURE — 99284 EMERGENCY DEPT VISIT MOD MDM: CPT

## 2025-08-26 PROCEDURE — 73030 X-RAY EXAM OF SHOULDER: CPT | Mod: LT

## 2025-08-26 RX ORDER — IBUPROFEN 200 MG
600 TABLET ORAL ONCE
Refills: 0 | Status: COMPLETED | OUTPATIENT
Start: 2025-08-26 | End: 2025-08-26

## 2025-08-26 RX ADMIN — Medication 600 MILLIGRAM(S): at 20:09

## 2025-08-27 ENCOUNTER — TRANSCRIPTION ENCOUNTER (OUTPATIENT)
Age: 35
End: 2025-08-27